# Patient Record
Sex: FEMALE | Race: WHITE | NOT HISPANIC OR LATINO | Employment: UNEMPLOYED | URBAN - METROPOLITAN AREA
[De-identification: names, ages, dates, MRNs, and addresses within clinical notes are randomized per-mention and may not be internally consistent; named-entity substitution may affect disease eponyms.]

---

## 2018-10-22 ENCOUNTER — OFFICE VISIT CONVERTED (OUTPATIENT)
Dept: FAMILY MEDICINE CLINIC | Facility: CLINIC | Age: 23
End: 2018-10-22
Attending: NURSE PRACTITIONER

## 2019-08-14 ENCOUNTER — HOSPITAL ENCOUNTER (OUTPATIENT)
Dept: URGENT CARE | Facility: CLINIC | Age: 24
Discharge: HOME | End: 2019-08-14
Attending: EMERGENCY MEDICINE

## 2019-11-14 ENCOUNTER — HOSPITAL ENCOUNTER (OUTPATIENT)
Dept: URGENT CARE | Facility: CLINIC | Age: 24
Discharge: HOME | End: 2019-11-14
Attending: NURSE PRACTITIONER

## 2019-11-16 LAB — BACTERIA SPEC AEROBE CULT: NORMAL

## 2019-11-26 ENCOUNTER — HOSPITAL ENCOUNTER (OUTPATIENT)
Dept: URGENT CARE | Facility: CLINIC | Age: 24
Discharge: HOME | End: 2019-11-26
Attending: PHYSICIAN ASSISTANT

## 2019-11-28 LAB — BACTERIA SPEC AEROBE CULT: NORMAL

## 2020-03-12 ENCOUNTER — HOSPITAL ENCOUNTER (OUTPATIENT)
Dept: CARDIOLOGY | Facility: HOSPITAL | Age: 25
Discharge: HOME | End: 2020-03-12
Attending: NURSE PRACTITIONER

## 2020-07-23 ENCOUNTER — HOSPITAL ENCOUNTER (OUTPATIENT)
Dept: URGENT CARE | Facility: CLINIC | Age: 25
Discharge: HOME | End: 2020-07-23
Attending: EMERGENCY MEDICINE

## 2020-07-30 ENCOUNTER — HOSPITAL ENCOUNTER (OUTPATIENT)
Dept: URGENT CARE | Facility: CLINIC | Age: 25
Discharge: HOME | End: 2020-07-30
Attending: FAMILY MEDICINE

## 2020-08-10 ENCOUNTER — HOSPITAL ENCOUNTER (OUTPATIENT)
Dept: URGENT CARE | Facility: CLINIC | Age: 25
Discharge: HOME | End: 2020-08-10
Attending: FAMILY MEDICINE

## 2020-09-30 ENCOUNTER — HOSPITAL ENCOUNTER (OUTPATIENT)
Dept: URGENT CARE | Facility: CLINIC | Age: 25
Discharge: HOME | End: 2020-09-30
Attending: FAMILY MEDICINE

## 2020-09-30 LAB
ALBUMIN SERPL-MCNC: 4.2 G/DL (ref 3.5–5)
ALBUMIN/GLOB SERPL: 1.5 {RATIO} (ref 1.4–2.6)
ALP SERPL-CCNC: 81 U/L (ref 42–98)
ALT SERPL-CCNC: 17 U/L (ref 10–40)
ANION GAP SERPL CALC-SCNC: 13 MMOL/L (ref 8–19)
AST SERPL-CCNC: 20 U/L (ref 15–50)
BASOPHILS # BLD AUTO: 0.1 10*3/UL (ref 0–0.2)
BASOPHILS NFR BLD AUTO: 1.2 % (ref 0–3)
BILIRUB SERPL-MCNC: 0.27 MG/DL (ref 0.2–1.3)
BUN SERPL-MCNC: 9 MG/DL (ref 5–25)
BUN/CREAT SERPL: 10 {RATIO} (ref 6–20)
CALCIUM SERPL-MCNC: 9 MG/DL (ref 8.7–10.4)
CHLORIDE SERPL-SCNC: 103 MMOL/L (ref 99–111)
CONV ABS IMM GRAN: 0.07 10*3/UL (ref 0–0.2)
CONV CO2: 26 MMOL/L (ref 22–32)
CONV IMMATURE GRAN: 0.8 % (ref 0–1.8)
CONV TOTAL PROTEIN: 7 G/DL (ref 6.3–8.2)
CREAT UR-MCNC: 0.89 MG/DL (ref 0.5–0.9)
DEPRECATED RDW RBC AUTO: 46 FL (ref 36.4–46.3)
EOSINOPHIL # BLD AUTO: 0.18 10*3/UL (ref 0–0.7)
EOSINOPHIL # BLD AUTO: 2.1 % (ref 0–7)
ERYTHROCYTE [DISTWIDTH] IN BLOOD BY AUTOMATED COUNT: 12.6 % (ref 11.7–14.4)
GFR SERPLBLD BASED ON 1.73 SQ M-ARVRAT: >60 ML/MIN/{1.73_M2}
GLOBULIN UR ELPH-MCNC: 2.8 G/DL (ref 2–3.5)
GLUCOSE SERPL-MCNC: 90 MG/DL (ref 65–99)
HCT VFR BLD AUTO: 44.1 % (ref 37–47)
HGB BLD-MCNC: 14.3 G/DL (ref 12–16)
LYMPHOCYTES # BLD AUTO: 1.9 10*3/UL (ref 1–5)
LYMPHOCYTES NFR BLD AUTO: 22.3 % (ref 20–45)
MCH RBC QN AUTO: 32.3 PG (ref 27–31)
MCHC RBC AUTO-ENTMCNC: 32.4 G/DL (ref 33–37)
MCV RBC AUTO: 99.5 FL (ref 81–99)
MONOCYTES # BLD AUTO: 0.65 10*3/UL (ref 0.2–1.2)
MONOCYTES NFR BLD AUTO: 7.6 % (ref 3–10)
NEUTROPHILS # BLD AUTO: 5.61 10*3/UL (ref 2–8)
NEUTROPHILS NFR BLD AUTO: 66 % (ref 30–85)
NRBC CBCN: 0 % (ref 0–0.7)
OSMOLALITY SERPL CALC.SUM OF ELEC: 282 MOSM/KG (ref 273–304)
PLATELET # BLD AUTO: 270 10*3/UL (ref 130–400)
PMV BLD AUTO: 10.7 FL (ref 9.4–12.3)
POTASSIUM SERPL-SCNC: 4.6 MMOL/L (ref 3.5–5.3)
RBC # BLD AUTO: 4.43 10*6/UL (ref 4.2–5.4)
SODIUM SERPL-SCNC: 137 MMOL/L (ref 135–147)
T4 FREE SERPL-MCNC: 1 NG/DL (ref 0.9–1.8)
TSH SERPL-ACNC: 1.23 M[IU]/L (ref 0.27–4.2)
WBC # BLD AUTO: 8.51 10*3/UL (ref 4.8–10.8)

## 2020-10-02 LAB
BACTERIA SPEC AEROBE CULT: NORMAL
SARS-COV-2 RNA SPEC QL NAA+PROBE: NOT DETECTED

## 2020-10-26 ENCOUNTER — HOSPITAL ENCOUNTER (OUTPATIENT)
Dept: URGENT CARE | Facility: CLINIC | Age: 25
Discharge: HOME | End: 2020-10-26
Attending: PHYSICIAN ASSISTANT

## 2020-10-29 LAB — SARS-COV-2 RNA SPEC QL NAA+PROBE: NOT DETECTED

## 2020-12-10 ENCOUNTER — HOSPITAL ENCOUNTER (OUTPATIENT)
Dept: OTHER | Facility: HOSPITAL | Age: 25
Discharge: HOME | End: 2020-12-10
Attending: EMERGENCY MEDICINE

## 2020-12-12 LAB — SARS-COV-2 RNA SPEC QL NAA+PROBE: NOT DETECTED

## 2021-01-15 ENCOUNTER — HOSPITAL ENCOUNTER (OUTPATIENT)
Dept: URGENT CARE | Facility: CLINIC | Age: 26
Discharge: HOME | End: 2021-01-15
Attending: NURSE PRACTITIONER

## 2021-04-20 ENCOUNTER — OFFICE VISIT CONVERTED (OUTPATIENT)
Dept: UROLOGY | Facility: CLINIC | Age: 26
End: 2021-04-20
Attending: NURSE PRACTITIONER

## 2021-04-20 LAB
BILIRUB UR QL STRIP: NEGATIVE
COLOR UR: YELLOW
CONV BACTERIA IN URINE MICRO: 0
CONV CALCIUM OXALATE CRYSTALS /HPF IN URINE SEDIMENT BY MICROSCOPY: 0
CONV CLARITY OF URINE: CLEAR
CONV PROTEIN IN URINE BY AUTOMATED TEST STRIP: NEGATIVE
CONV UROBILINOGEN IN URINE BY AUTOMATED TEST STRIP: NORMAL
GLUCOSE UR QL: NEGATIVE
HGB UR QL STRIP: NEGATIVE
KETONES UR QL STRIP: NEGATIVE
LEUKOCYTE ESTERASE UR QL STRIP: NEGATIVE
NITRITE UR QL STRIP: NEGATIVE
PH UR STRIP.AUTO: 6.5 [PH]
RBC #/AREA URNS HPF: 0 /[HPF]
RENAL EPI CELLS #/AREA URNS HPF: 0 /[HPF]
SP GR UR: 1.02
SQUAMOUS SPT QL MICRO: 0
WBC #/AREA URNS HPF: 0 /[HPF]
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
## 2021-05-14 VITALS — BODY MASS INDEX: 36.34 KG/M2 | WEIGHT: 218.12 LBS | HEIGHT: 65 IN | RESPIRATION RATE: 12 BRPM

## 2021-05-16 VITALS
WEIGHT: 183.5 LBS | RESPIRATION RATE: 14 BRPM | OXYGEN SATURATION: 98 % | DIASTOLIC BLOOD PRESSURE: 50 MMHG | TEMPERATURE: 98.3 F | SYSTOLIC BLOOD PRESSURE: 122 MMHG | BODY MASS INDEX: 30.57 KG/M2 | HEIGHT: 65 IN | HEART RATE: 88 BPM

## 2021-08-23 ENCOUNTER — HOSPITAL ENCOUNTER (EMERGENCY)
Facility: HOSPITAL | Age: 26
Discharge: HOME | End: 2021-08-23
Attending: EMERGENCY MEDICINE | Admitting: EMERGENCY MEDICINE

## 2021-08-23 VITALS
HEART RATE: 69 BPM | WEIGHT: 216.05 LBS | DIASTOLIC BLOOD PRESSURE: 66 MMHG | SYSTOLIC BLOOD PRESSURE: 92 MMHG | OXYGEN SATURATION: 95 % | HEIGHT: 63 IN | RESPIRATION RATE: 16 BRPM | TEMPERATURE: 98 F | BODY MASS INDEX: 38.28 KG/M2

## 2021-08-23 DIAGNOSIS — N93.8 DYSFUNCTIONAL UTERINE BLEEDING: Primary | ICD-10-CM

## 2021-08-23 LAB
BACTERIA UR QL AUTO: ABNORMAL /HPF
BASOPHILS # BLD AUTO: 0.04 10*3/MM3 (ref 0–0.2)
BASOPHILS NFR BLD AUTO: 0.6 % (ref 0–1.5)
BILIRUB UR QL STRIP: NEGATIVE
CLARITY UR: CLEAR
COLOR UR: YELLOW
DEPRECATED RDW RBC AUTO: 43.4 FL (ref 37–54)
EOSINOPHIL # BLD AUTO: 0.18 10*3/MM3 (ref 0–0.4)
EOSINOPHIL NFR BLD AUTO: 2.7 % (ref 0.3–6.2)
ERYTHROCYTE [DISTWIDTH] IN BLOOD BY AUTOMATED COUNT: 12.7 % (ref 12.3–15.4)
GLUCOSE UR STRIP-MCNC: NEGATIVE MG/DL
HCG INTACT+B SERPL-ACNC: <0.5 MIU/ML
HCT VFR BLD AUTO: 41.2 % (ref 34–46.6)
HGB BLD-MCNC: 13.9 G/DL (ref 12–15.9)
HGB UR QL STRIP.AUTO: ABNORMAL
HOLD SPECIMEN: NORMAL
HOLD SPECIMEN: NORMAL
HYALINE CASTS UR QL AUTO: ABNORMAL /LPF
IMM GRANULOCYTES # BLD AUTO: 0.03 10*3/MM3 (ref 0–0.05)
IMM GRANULOCYTES NFR BLD AUTO: 0.5 % (ref 0–0.5)
KETONES UR QL STRIP: NEGATIVE
LEUKOCYTE ESTERASE UR QL STRIP.AUTO: ABNORMAL
LIPASE SERPL-CCNC: 12 U/L (ref 13–60)
LYMPHOCYTES # BLD AUTO: 1.79 10*3/MM3 (ref 0.7–3.1)
LYMPHOCYTES NFR BLD AUTO: 27 % (ref 19.6–45.3)
MCH RBC QN AUTO: 31.3 PG (ref 26.6–33)
MCHC RBC AUTO-ENTMCNC: 33.7 G/DL (ref 31.5–35.7)
MCV RBC AUTO: 92.8 FL (ref 79–97)
MONOCYTES # BLD AUTO: 0.49 10*3/MM3 (ref 0.1–0.9)
MONOCYTES NFR BLD AUTO: 7.4 % (ref 5–12)
NEUTROPHILS NFR BLD AUTO: 4.11 10*3/MM3 (ref 1.7–7)
NEUTROPHILS NFR BLD AUTO: 61.8 % (ref 42.7–76)
NITRITE UR QL STRIP: NEGATIVE
NRBC BLD AUTO-RTO: 0 /100 WBC (ref 0–0.2)
PH UR STRIP.AUTO: 7 [PH] (ref 5–8)
PLATELET # BLD AUTO: 233 10*3/MM3 (ref 140–450)
PMV BLD AUTO: 10.2 FL (ref 6–12)
PROT UR QL STRIP: NEGATIVE
RBC # BLD AUTO: 4.44 10*6/MM3 (ref 3.77–5.28)
RBC # UR: ABNORMAL /HPF
REF LAB TEST METHOD: ABNORMAL
SP GR UR STRIP: 1.01 (ref 1–1.03)
SQUAMOUS #/AREA URNS HPF: ABNORMAL /HPF
UROBILINOGEN UR QL STRIP: ABNORMAL
WBC # BLD AUTO: 6.64 10*3/MM3 (ref 3.4–10.8)
WBC UR QL AUTO: ABNORMAL /HPF
WHOLE BLOOD HOLD SPECIMEN: NORMAL

## 2021-08-23 PROCEDURE — 84702 CHORIONIC GONADOTROPIN TEST: CPT | Performed by: EMERGENCY MEDICINE

## 2021-08-23 PROCEDURE — 85025 COMPLETE CBC W/AUTO DIFF WBC: CPT | Performed by: EMERGENCY MEDICINE

## 2021-08-23 PROCEDURE — 83690 ASSAY OF LIPASE: CPT | Performed by: EMERGENCY MEDICINE

## 2021-08-23 PROCEDURE — 99283 EMERGENCY DEPT VISIT LOW MDM: CPT

## 2021-08-23 PROCEDURE — 81001 URINALYSIS AUTO W/SCOPE: CPT | Performed by: EMERGENCY MEDICINE

## 2021-08-23 RX ORDER — SODIUM CHLORIDE 0.9 % (FLUSH) 0.9 %
10 SYRINGE (ML) INJECTION AS NEEDED
Status: DISCONTINUED | OUTPATIENT
Start: 2021-08-23 | End: 2021-08-23 | Stop reason: HOSPADM

## 2021-08-23 NOTE — ED PROVIDER NOTES
"Subjective   Pt states she took + pregnancy test at home last night and this AM woke up to cramping and bleeding.      History provided by:  Patient   used: No    Vaginal Bleeding  Quality:  Bright red and clots  Severity:  Moderate  Onset quality:  Gradual  Duration:  12 hours  Timing:  Intermittent  Progression:  Worsening  Chronicity:  New  Possible pregnancy: yes    Relieved by:  Nothing  Worsened by:  Nothing  Ineffective treatments:  None tried  Associated symptoms: no fatigue and no fever        Review of Systems   Constitutional: Negative for appetite change, chills, fatigue and fever.   HENT: Negative.    Eyes: Negative.  Negative for photophobia.   Respiratory: Negative.    Gastrointestinal: Negative.    Endocrine: Negative.    Genitourinary: Positive for vaginal bleeding.   Musculoskeletal: Negative.    Skin: Negative.    Allergic/Immunologic: Negative.  Negative for immunocompromised state.   Neurological: Negative.    Hematological: Negative.    Psychiatric/Behavioral: Negative.    All other systems reviewed and are negative.      Past Medical History:   Diagnosis Date   • Allergic rhinitis    • Anxiety    • Broken bones    • Chronic allergic rhinitis    • Condition not found     Ulcer, Unspecified   • Depression    • Forgetfulness    • H/O psychiatric care    • HBP (high blood pressure)    • Hx of migraine headaches    • Mood disorder (CMS/[facility])    • Rectal bleeding    • Seizure (CMS/[facility])    • Sinus trouble    • Urinary incontinence 04/20/2021       Allergies   Allergen Reactions   • Dilaudid [Hydromorphone] Diarrhea       Past Surgical History:   Procedure Laterality Date   • ARM TENDON REPAIR      Arm Surgery   • MANDIBLE SURGERY      Jaw Wired   • OTHER SURGICAL HISTORY      \"Metal Implant\"   • [place] TOOTH EXTRACTION         Family History   Problem Relation Age of Onset   • Diabetes Paternal Grandmother         Unspecified Type   • Diabetes Paternal Grandfather         " Unspecified Type   • Diabetes Paternal Uncle         Unspecified Type   • Colon cancer Other         In her 60s   • Breast cancer Other         In her 60s       Social History     Socioeconomic History   • Marital status: Single     Spouse name: Not on file   • Number of children: Not on file   • Years of education: Not on file   • Highest education level: Not on file   Tobacco Use   • Smoking status: Current Every Day Smoker           Objective   Physical Exam  Vitals and nursing note reviewed. Exam conducted with a chaperone present.   Constitutional:       General: She is not in acute distress.     Appearance: Normal appearance. She is not toxic-appearing.   HENT:      Head: Normocephalic and atraumatic.   Eyes:      Extraocular Movements: Extraocular movements intact.      Pupils: Pupils are equal, round, and reactive to light.   Cardiovascular:      Rate and Rhythm: Normal rate and regular rhythm.      Pulses: Normal pulses.      Heart sounds: Normal heart sounds.   Pulmonary:      Effort: Pulmonary effort is normal. No respiratory distress.      Breath sounds: Normal breath sounds.   Abdominal:      General: Abdomen is flat.      Palpations: Abdomen is soft.      Tenderness: There is abdominal tenderness (suprapubic).   Genitourinary:     Uterus: Normal.       Adnexa: Right adnexa normal and left adnexa normal.      Comments: Bright red blood  Musculoskeletal:         General: Normal range of motion.      Cervical back: Normal range of motion and neck supple.   Skin:     General: Skin is warm and dry.   Neurological:      Mental Status: She is alert and oriented to person, place, and time. Mental status is at baseline.           MDM  Number of Diagnoses or Management Options  Dysfunctional uterine bleeding  Diagnosis management comments: Pt is a 26 y.o. female that presents today with Patient presents with:  Pregnancy Problem: PT STATES THAT SHE IS 8 DAYS LATE AND SHE KEPT TAKING PREGNANCY TESTS AND THE ONE  FROM YESTERDAY WAS POSITIVE, PT STARTED BLEEDING AND HAVING EXCESSIVE CRAMPING TODAY.        Work up today:  Lab Results (last 24 hours)     Procedure Component Value Units Date/Time    CBC & Differential ([id number])  (Normal) Collected: 08/23/21 1214    Specimen: Blood Updated: 08/23/21 1250    Narrative:      The following orders were created for panel order CBC & Differential.  Procedure                               Abnormality         Status                       ---------                               -----------         ------                       CBC Auto Differential([id number])        Normal              Final result                   Please view results for these tests on the individual orders.    Lipase ([id number])  (Abnormal) Collected: 08/23/21 1214    Specimen: Blood Updated: 08/23/21 1250     Lipase 12 U/L     hCG, Quantitative, Pregnancy ([id number]) Collected: 08/23/21 1214    Specimen: Blood Updated: 08/23/21 1253     HCG Quantitative <0.50 mIU/mL     Narrative:      HCG Ranges by Gestational Age    Females - non-pregnant premenopausal   </= 1mIU/mL HCG  Females - postmenopausal               </= 7mIU/mL HCG    3 Weeks       5.4   -      72 mIU/mL  4 Weeks      10.2   -     708 mIU/mL  5 Weeks       217   -   8,245 mIU/mL  6 Weeks       152   -  32,177 mIU/mL  7 Weeks     4,059   - 153,767 mIU/mL  8 Weeks    31,366   - 149,094 mIU/mL  9 Weeks    59,109   - 135,901 mIU/mL  10 Weeks   44,186   - 170,409 mIU/mL  12 Weeks   27,107   - 201,615 mIU/mL  14 Weeks   24,302   -  93,646 mIU/mL  15 Weeks   12,540   -  69,747 mIU/mL  16 Weeks    8,904   -  55,332 mIU/mL  17 Weeks    8,240   -  51,793 mIU/mL  18 Weeks    9,649   -  55,271 mIU/mL    Results may be falsely decreased if patient taking Biotin.      Urinalysis With Microscopic If Indicated (No Culture) - Urine, Clean   Catch ([id number]) Collected: 08/23/21 1214    Specimen: Urine, Clean Catch Updated: 08/23/21 1240    CBC Auto Differential  ([id number])  (Normal) Collected: 08/23/21 1214    Specimen: Blood Updated: 08/23/21 1250     WBC 6.64 10*3/mm3      RBC 4.44 10*6/mm3      Hemoglobin 13.9 g/dL      Hematocrit 41.2 %      MCV 92.8 fL      MCH 31.3 pg      MCHC 33.7 g/dL      RDW 12.7 %      RDW-SD 43.4 fl      MPV 10.2 fL      Platelets 233 10*3/mm3      Neutrophil % 61.8 %      Lymphocyte % 27.0 %      Monocyte % 7.4 %      Eosinophil % 2.7 %      Basophil % 0.6 %      Immature Grans % 0.5 %      Neutrophils, Absolute 4.11 10*3/mm3      Lymphocytes, Absolute 1.79 10*3/mm3      Monocytes, Absolute 0.49 10*3/mm3      Eosinophils, Absolute 0.18 10*3/mm3      Basophils, Absolute 0.04 10*3/mm3      Immature Grans, Absolute 0.03 10*3/mm3      nRBC 0.0 /100 WBC     Urinalysis, Microscopic Only - Urine, Clean Catch ([id number]) Collected:   08/23/21 1214    Specimen: Urine, Clean Catch Updated: 08/23/21 1251      No results found.   @No orders to display     Pt is otherwise non toxic and non ill appearing and stable for d/c home.  Pt is in agreement with this.  All questions answered at bedside.          Amount and/or Complexity of Data Reviewed  Clinical lab tests: reviewed    Risk of Complications, Morbidity, and/or Mortality  Presenting problems: low  Diagnostic procedures: low  Management options: low    Patient Progress  Patient progress: stable      Final diagnoses:   Dysfunctional uterine bleeding       ED Disposition  ED Disposition     ED Disposition Condition Comment    Discharge Stable           Amber Pearson, APRN  [address] [facility] [address]  [place] [address]  [phone]    Schedule an appointment as soon as possible for a visit in 1 week  for further eval         Medication List      No changes were made to your prescriptions during this visit.          Idris Reed PA  08/23/21 1300

## 2021-09-10 PROCEDURE — U0004 COV-19 TEST NON-CDC HGH THRU: HCPCS | Performed by: NURSE PRACTITIONER

## 2021-09-12 ENCOUNTER — TELEPHONE (OUTPATIENT)
Dept: URGENT CARE | Facility: CLINIC | Age: 26
End: 2021-09-12

## 2021-09-12 NOTE — TELEPHONE ENCOUNTER
----- Message from GENET Fisher sent at 9/11/2021  6:06 PM EDT -----  Please call the patient regarding her normal result.    Please let patient know that her COVID-19 test is negative.

## 2021-10-14 ENCOUNTER — OFFICE VISIT (OUTPATIENT)
Dept: OBSTETRICS AND GYNECOLOGY | Facility: CLINIC | Age: 26
End: 2021-10-14

## 2021-10-14 VITALS
DIASTOLIC BLOOD PRESSURE: 79 MMHG | WEIGHT: 216 LBS | SYSTOLIC BLOOD PRESSURE: 115 MMHG | HEART RATE: 79 BPM | HEIGHT: 65 IN | BODY MASS INDEX: 35.99 KG/M2

## 2021-10-14 DIAGNOSIS — B00.9 HSV (HERPES SIMPLEX VIRUS) INFECTION: ICD-10-CM

## 2021-10-14 DIAGNOSIS — Z01.419 ROUTINE GYNECOLOGICAL EXAMINATION: Primary | ICD-10-CM

## 2021-10-14 PROBLEM — F41.9 ANXIETY: Status: ACTIVE | Noted: 2019-03-29

## 2021-10-14 PROBLEM — F32.A DEPRESSION: Status: ACTIVE | Noted: 2021-10-14

## 2021-10-14 PROBLEM — T74.21XA ADULT VICTIM OF SEXUAL ABUSE: Status: ACTIVE | Noted: 2019-03-29

## 2021-10-14 PROBLEM — R32 URINARY INCONTINENCE: Status: ACTIVE | Noted: 2021-04-20

## 2021-10-14 PROBLEM — I10 HIGH BLOOD PRESSURE: Status: ACTIVE | Noted: 2021-10-14

## 2021-10-14 PROBLEM — R56.9 SEIZURE: Status: ACTIVE | Noted: 2021-10-14

## 2021-10-14 PROBLEM — F12.90 MARIJUANA USER: Status: ACTIVE | Noted: 2021-09-13

## 2021-10-14 PROBLEM — J34.9 SINUS TROUBLE: Status: ACTIVE | Noted: 2021-10-14

## 2021-10-14 PROBLEM — J30.9 ALLERGIC RHINITIS: Status: ACTIVE | Noted: 2021-10-14

## 2021-10-14 PROBLEM — T14.8XXA BROKEN BONES: Status: ACTIVE | Noted: 2021-10-14

## 2021-10-14 PROBLEM — E55.9 VITAMIN D DEFICIENCY: Status: ACTIVE | Noted: 2020-02-11

## 2021-10-14 PROBLEM — R68.89 FORGETFULNESS: Status: ACTIVE | Noted: 2021-10-14

## 2021-10-14 PROBLEM — K62.5 RECTAL BLEEDING: Status: ACTIVE | Noted: 2021-10-14

## 2021-10-14 PROBLEM — IMO0002 ULCERATIVE LESION: Status: ACTIVE | Noted: 2021-10-14

## 2021-10-14 PROBLEM — F17.200 NICOTINE DEPENDENCE: Status: ACTIVE | Noted: 2019-03-29

## 2021-10-14 PROBLEM — E28.2 POLYCYSTIC OVARY SYNDROME: Status: ACTIVE | Noted: 2021-07-20

## 2021-10-14 LAB
GLUCOSE UR STRIP-MCNC: NEGATIVE MG/DL
PROT UR STRIP-MCNC: NEGATIVE MG/DL

## 2021-10-14 PROCEDURE — 3008F BODY MASS INDEX DOCD: CPT | Performed by: OBSTETRICS & GYNECOLOGY

## 2021-10-14 PROCEDURE — 2014F MENTAL STATUS ASSESS: CPT | Performed by: OBSTETRICS & GYNECOLOGY

## 2021-10-14 PROCEDURE — 99395 PREV VISIT EST AGE 18-39: CPT | Performed by: OBSTETRICS & GYNECOLOGY

## 2021-10-14 PROCEDURE — 88175 CYTOPATH C/V AUTO FLUID REDO: CPT | Performed by: OBSTETRICS & GYNECOLOGY

## 2021-10-14 RX ORDER — PAROXETINE 10 MG/1
10 TABLET, FILM COATED ORAL NIGHTLY
COMMUNITY
End: 2021-12-20

## 2021-10-14 RX ORDER — BUSPIRONE HYDROCHLORIDE 15 MG/1
15 TABLET ORAL DAILY
COMMUNITY
End: 2022-07-21

## 2021-10-14 RX ORDER — VALACYCLOVIR HYDROCHLORIDE 500 MG/1
500 TABLET, FILM COATED ORAL DAILY
Qty: 90 TABLET | Refills: 3 | Status: SHIPPED | OUTPATIENT
Start: 2021-10-14

## 2021-10-14 NOTE — PROGRESS NOTES
"Well Woman Visit    CC: Annual well woman exam       HPI:   26 y.o. Contraception or HRT: None  Menses:   q mon, lasts 3-5 days, changes products q 3hrs on heaviest days.   Pain:  Mild, OTC meds control discomfort    Pt has no complaints today. no hsv outbreaks with daily valtrex.      History: PMHx, Meds, Allergies, PSHx, Social Hx, and POBHx all reviewed and updated.      PHYSICAL EXAM:  /79 (BP Location: Right arm, Patient Position: Sitting, Cuff Size: Adult)   Pulse 79   Ht 165.1 cm (65\")   Wt 98 kg (216 lb)   LMP 2021   Breastfeeding No   BMI 35.94 kg/m²   General- NAD, alert and oriented, appropriate  Psych- Normal mood, good memory  Neck- No masses, no thyroid enlargement  CV- Regular rhythm, no murnurs  Resp- CTA to bases, no wheezes  Abdomen- Soft, non distended, non tender, no masses    Breast left-  Bilaterally symmetrical, no masses, non tender, no nipple discharge  Breast right- Bilaterally symmetrical, no masses, non tender, no nipple discharge    External genitalia- Normal female, no lesions  Urethra/meatus- Normal, no masses, non tender, no prolapse  Bladder- Normal, no masses, non tender, no prolapse  Vagina- Normal, no atrophy, no lesions, no discharge, no prolapse  Cvx- Normal, no lesions, no discharge, No cervical motion tenderness  Uterus- Normal size, shape & consistency.  Non tender, mobile, & no prolapse  Adnexa- No mass, non tender  Anus/Rectum/Perineum- Not performed    Lymphatic- No palpable neck, axillary, or groin nodes  Ext- No edema, no cyanosis    Skin- No lesions, no rashes, no acanthosis nigricans        ASSESSMENT and PLAN:  WWE    Diagnoses and all orders for this visit:    1. Routine gynecological examination (Primary)  -     POC Urinalysis Dipstick  -     IGP,rfx Aptima HPV All Pth    2. HSV (herpes simplex virus) infection  -     valACYclovir (VALTREX) 500 MG tablet; Take 1 tablet by mouth Daily.  Dispense: 90 tablet; Refill: 3    declines " bc    Counseling:     Track menses, RTO IF <q21d, >7d long, or heavy    Domestic violence/abuse screen: negative    Depression screen: no SI    Preventative:   BREAST HEALTH- Monthly self breast exam importance and how to reviewed. MMG and/or MRI (prn) reviewed per society guidelines and her individual history. Screen: Not medically needed.  CERVICAL CANCER Screening- Reviewed current ASCCP guidelines for screening w and wo cotest HPV, age specific.  Screen: Updated today, lgsil 2020.  COLON CANCER Screening- Reviewed current medical society guidelines and options.  Screen:  Not medically needed.  SEXUAL HEALTH: STD screening recommended.  She declines.  She understands risks of STDs NLT infection (herself and current/future partners), infertility, chronic pain, potential future surgery/complications.  VACCINATIONS Recommended: Flu annually, Gardisil/HPV vaccine (up to 44yo).  Importance discussed, risk being unvaccinated reviewed.  Questions answered  SMOKING STATUS- pt does use nicotine and/or tobacco.  I reviewed importance of avoiding.  Options to quit provided per [employer] protocols.  Recommend FU w PCP regarding quitting options if unable to quit wo assistance.  Myriad: Does not qualify.  Gardasil status: declines.      She understands the importance of having any ordered tests to be performed in a timely fashion.  She is encouraged to review her results online and/or contact or office if she has questions.     Follow Up:  Return if symptoms worsen or fail to improve.      Kia Jasso, APRN  10/14/2021

## 2021-10-19 LAB
CONV .: NORMAL
CYTOLOGIST CVX/VAG CYTO: NORMAL
CYTOLOGY CVX/VAG DOC CYTO: NORMAL
CYTOLOGY CVX/VAG DOC THIN PREP: NORMAL
DX ICD CODE: NORMAL
HIV 1 & 2 AB SER-IMP: NORMAL
OTHER STN SPEC: NORMAL
STAT OF ADQ CVX/VAG CYTO-IMP: NORMAL

## 2022-01-15 PROCEDURE — 87086 URINE CULTURE/COLONY COUNT: CPT | Performed by: NURSE PRACTITIONER

## 2022-01-16 ENCOUNTER — TELEPHONE (OUTPATIENT)
Dept: URGENT CARE | Facility: CLINIC | Age: 27
End: 2022-01-16

## 2022-01-16 NOTE — TELEPHONE ENCOUNTER
----- Message from GENET Chávez sent at 1/16/2022  1:33 PM EST -----  Please call the patient regarding her negative result.

## 2022-01-20 PROCEDURE — U0004 COV-19 TEST NON-CDC HGH THRU: HCPCS | Performed by: FAMILY MEDICINE

## 2022-01-21 ENCOUNTER — TELEPHONE (OUTPATIENT)
Dept: URGENT CARE | Facility: CLINIC | Age: 27
End: 2022-01-21

## 2022-03-17 ENCOUNTER — TRANSCRIBE ORDERS (OUTPATIENT)
Dept: GENERAL RADIOLOGY | Facility: HOSPITAL | Age: 27
End: 2022-03-17

## 2022-03-17 ENCOUNTER — HOSPITAL ENCOUNTER (OUTPATIENT)
Dept: GENERAL RADIOLOGY | Facility: HOSPITAL | Age: 27
Discharge: HOME | End: 2022-03-17
Admitting: NURSE PRACTITIONER

## 2022-03-17 DIAGNOSIS — M54.50 LOW BACK PAIN, UNSPECIFIED BACK PAIN LATERALITY, UNSPECIFIED CHRONICITY, UNSPECIFIED WHETHER SCIATICA PRESENT: ICD-10-CM

## 2022-03-17 DIAGNOSIS — M54.50 LOW BACK PAIN, UNSPECIFIED BACK PAIN LATERALITY, UNSPECIFIED CHRONICITY, UNSPECIFIED WHETHER SCIATICA PRESENT: Primary | ICD-10-CM

## 2022-03-17 PROCEDURE — 72100 X-RAY EXAM L-S SPINE 2/3 VWS: CPT

## 2023-08-11 ENCOUNTER — APPOINTMENT (OUTPATIENT)
Dept: CT IMAGING | Facility: HOSPITAL | Age: 28
End: 2023-08-11
Payer: COMMERCIAL

## 2023-08-11 ENCOUNTER — APPOINTMENT (OUTPATIENT)
Dept: ULTRASOUND IMAGING | Facility: HOSPITAL | Age: 28
End: 2023-08-11
Payer: COMMERCIAL

## 2023-08-11 ENCOUNTER — HOSPITAL ENCOUNTER (EMERGENCY)
Facility: HOSPITAL | Age: 28
Discharge: HOME | End: 2023-08-11
Attending: EMERGENCY MEDICINE
Payer: COMMERCIAL

## 2023-08-11 VITALS
SYSTOLIC BLOOD PRESSURE: 137 MMHG | OXYGEN SATURATION: 97 % | BODY MASS INDEX: 40.7 KG/M2 | RESPIRATION RATE: 14 BRPM | WEIGHT: 244.27 LBS | TEMPERATURE: 98.3 F | HEART RATE: 65 BPM | HEIGHT: 65 IN | DIASTOLIC BLOOD PRESSURE: 69 MMHG

## 2023-08-11 DIAGNOSIS — N83.209 CYST OF OVARY, UNSPECIFIED LATERALITY: Primary | ICD-10-CM

## 2023-08-11 LAB
ALBUMIN SERPL-MCNC: 4 G/DL (ref 3.5–5.2)
ALBUMIN/GLOB SERPL: 1.4 G/DL
ALP SERPL-CCNC: 86 U/L (ref 39–117)
ALT SERPL W P-5'-P-CCNC: 13 U/L (ref 1–33)
ANION GAP SERPL CALCULATED.3IONS-SCNC: 11.2 MMOL/L (ref 5–15)
AST SERPL-CCNC: 13 U/L (ref 1–32)
BACTERIA UR QL AUTO: ABNORMAL /HPF
BASOPHILS # BLD AUTO: 0.05 10*3/MM3 (ref 0–0.2)
BASOPHILS NFR BLD AUTO: 0.5 % (ref 0–1.5)
BILIRUB SERPL-MCNC: 0.3 MG/DL (ref 0–1.2)
BILIRUB UR QL STRIP: NEGATIVE
BUN SERPL-MCNC: 7 MG/DL (ref 6–20)
BUN/CREAT SERPL: 9.2 (ref 7–25)
CALCIUM SPEC-SCNC: 8.8 MG/DL (ref 8.6–10.5)
CHLORIDE SERPL-SCNC: 104 MMOL/L (ref 98–107)
CLARITY UR: ABNORMAL
CO2 SERPL-SCNC: 20.8 MMOL/L (ref 22–29)
COLOR UR: YELLOW
CREAT SERPL-MCNC: 0.76 MG/DL (ref 0.57–1)
DEPRECATED RDW RBC AUTO: 43.2 FL (ref 37–54)
EGFRCR SERPLBLD CKD-EPI 2021: 110.3 ML/MIN/1.73
EOSINOPHIL # BLD AUTO: 0.22 10*3/MM3 (ref 0–0.4)
EOSINOPHIL NFR BLD AUTO: 2.4 % (ref 0.3–6.2)
ERYTHROCYTE [DISTWIDTH] IN BLOOD BY AUTOMATED COUNT: 13.2 % (ref 12.3–15.4)
GLOBULIN UR ELPH-MCNC: 2.8 GM/DL
GLUCOSE SERPL-MCNC: 138 MG/DL (ref 65–99)
GLUCOSE UR STRIP-MCNC: NEGATIVE MG/DL
HCG INTACT+B SERPL-ACNC: <0.5 MIU/ML
HCT VFR BLD AUTO: 39.6 % (ref 34–46.6)
HGB BLD-MCNC: 13.5 G/DL (ref 12–15.9)
HGB UR QL STRIP.AUTO: NEGATIVE
HOLD SPECIMEN: NORMAL
HOLD SPECIMEN: NORMAL
HYALINE CASTS UR QL AUTO: ABNORMAL /LPF
IMM GRANULOCYTES # BLD AUTO: 0.08 10*3/MM3 (ref 0–0.05)
IMM GRANULOCYTES NFR BLD AUTO: 0.9 % (ref 0–0.5)
KETONES UR QL STRIP: NEGATIVE
LEUKOCYTE ESTERASE UR QL STRIP.AUTO: ABNORMAL
LIPASE SERPL-CCNC: 12 U/L (ref 13–60)
LYMPHOCYTES # BLD AUTO: 2.56 10*3/MM3 (ref 0.7–3.1)
LYMPHOCYTES NFR BLD AUTO: 27.8 % (ref 19.6–45.3)
MCH RBC QN AUTO: 30.8 PG (ref 26.6–33)
MCHC RBC AUTO-ENTMCNC: 34.1 G/DL (ref 31.5–35.7)
MCV RBC AUTO: 90.4 FL (ref 79–97)
MONOCYTES # BLD AUTO: 0.44 10*3/MM3 (ref 0.1–0.9)
MONOCYTES NFR BLD AUTO: 4.8 % (ref 5–12)
NEUTROPHILS NFR BLD AUTO: 5.85 10*3/MM3 (ref 1.7–7)
NEUTROPHILS NFR BLD AUTO: 63.6 % (ref 42.7–76)
NITRITE UR QL STRIP: NEGATIVE
NRBC BLD AUTO-RTO: 0 /100 WBC (ref 0–0.2)
PH UR STRIP.AUTO: 6.5 [PH] (ref 5–8)
PLATELET # BLD AUTO: 244 10*3/MM3 (ref 140–450)
PMV BLD AUTO: 9.5 FL (ref 6–12)
POTASSIUM SERPL-SCNC: 3.6 MMOL/L (ref 3.5–5.2)
PROT SERPL-MCNC: 6.8 G/DL (ref 6–8.5)
PROT UR QL STRIP: NEGATIVE
RBC # BLD AUTO: 4.38 10*6/MM3 (ref 3.77–5.28)
RBC # UR STRIP: ABNORMAL /HPF
REF LAB TEST METHOD: ABNORMAL
SODIUM SERPL-SCNC: 136 MMOL/L (ref 136–145)
SP GR UR STRIP: 1.02 (ref 1–1.03)
SQUAMOUS #/AREA URNS HPF: ABNORMAL /HPF
UROBILINOGEN UR QL STRIP: ABNORMAL
WBC # UR STRIP: ABNORMAL /HPF
WBC NRBC COR # BLD: 9.2 10*3/MM3 (ref 3.4–10.8)
WHOLE BLOOD HOLD COAG: NORMAL
WHOLE BLOOD HOLD SPECIMEN: NORMAL

## 2023-08-11 PROCEDURE — 25010000002 CEFTRIAXONE PER 250 MG: Performed by: EMERGENCY MEDICINE

## 2023-08-11 PROCEDURE — 96374 THER/PROPH/DIAG INJ IV PUSH: CPT

## 2023-08-11 PROCEDURE — 83690 ASSAY OF LIPASE: CPT | Performed by: EMERGENCY MEDICINE

## 2023-08-11 PROCEDURE — 81001 URINALYSIS AUTO W/SCOPE: CPT | Performed by: EMERGENCY MEDICINE

## 2023-08-11 PROCEDURE — 96375 TX/PRO/DX INJ NEW DRUG ADDON: CPT

## 2023-08-11 PROCEDURE — 96365 THER/PROPH/DIAG IV INF INIT: CPT

## 2023-08-11 PROCEDURE — 85025 COMPLETE CBC W/AUTO DIFF WBC: CPT | Performed by: EMERGENCY MEDICINE

## 2023-08-11 PROCEDURE — 25010000002 KETOROLAC TROMETHAMINE PER 15 MG: Performed by: EMERGENCY MEDICINE

## 2023-08-11 PROCEDURE — 99284 EMERGENCY DEPT VISIT MOD MDM: CPT

## 2023-08-11 PROCEDURE — 74176 CT ABD & PELVIS W/O CONTRAST: CPT

## 2023-08-11 PROCEDURE — 84702 CHORIONIC GONADOTROPIN TEST: CPT | Performed by: EMERGENCY MEDICINE

## 2023-08-11 PROCEDURE — 76830 TRANSVAGINAL US NON-OB: CPT

## 2023-08-11 PROCEDURE — 80053 COMPREHEN METABOLIC PANEL: CPT | Performed by: EMERGENCY MEDICINE

## 2023-08-11 RX ORDER — SODIUM CHLORIDE 0.9 % (FLUSH) 0.9 %
10 SYRINGE (ML) INJECTION AS NEEDED
Status: DISCONTINUED | OUTPATIENT
Start: 2023-08-11 | End: 2023-08-11 | Stop reason: HOSPADM

## 2023-08-11 RX ORDER — KETOROLAC TROMETHAMINE 10 MG/1
10 TABLET, FILM COATED ORAL EVERY 6 HOURS PRN
Qty: 15 TABLET | Refills: 0 | Status: SHIPPED | OUTPATIENT
Start: 2023-08-11

## 2023-08-11 RX ORDER — CEFTRIAXONE SODIUM 1 G/50ML
1000 INJECTION, SOLUTION INTRAVENOUS ONCE
Status: COMPLETED | OUTPATIENT
Start: 2023-08-11 | End: 2023-08-11

## 2023-08-11 RX ORDER — OXYCODONE HYDROCHLORIDE AND ACETAMINOPHEN 5; 325 MG/1; MG/1
1 TABLET ORAL EVERY 6 HOURS PRN
Qty: 12 TABLET | Refills: 0 | Status: SHIPPED | OUTPATIENT
Start: 2023-08-11

## 2023-08-11 RX ORDER — CEPHALEXIN 500 MG/1
500 CAPSULE ORAL 4 TIMES DAILY
Qty: 40 CAPSULE | Refills: 0 | Status: SHIPPED | OUTPATIENT
Start: 2023-08-11

## 2023-08-11 RX ORDER — KETOROLAC TROMETHAMINE 30 MG/ML
30 INJECTION, SOLUTION INTRAMUSCULAR; INTRAVENOUS ONCE
Status: COMPLETED | OUTPATIENT
Start: 2023-08-11 | End: 2023-08-11

## 2023-08-11 RX ADMIN — CEFTRIAXONE SODIUM 1000 MG: 1 INJECTION, SOLUTION INTRAVENOUS at 13:49

## 2023-08-11 RX ADMIN — KETOROLAC TROMETHAMINE 30 MG: 30 INJECTION, SOLUTION INTRAMUSCULAR; INTRAVENOUS at 13:49

## 2023-08-11 NOTE — ED PROVIDER NOTES
"Time: 3:54 PM EDT  Date of encounter:  8/11/2023  Independent Historian/Clinical History and Information was obtained by:   Patient    History is limited by: N/A    Chief Complaint: Abdominal pain      History of Present Illness:  Patient is a 28 y.o. year old female who presents to the emergency department for evaluation of right-sided abdominal pain that started earlier today.  Patient denies fever and chills.  Patient has no chest pain or shortness of breath.  Patient denies dysuria urinary frequency.  Patient denies cough and hemoptysis.  Patient denies nausea, vomiting, and diarrhea.    HPI    Patient Care Team  Primary Care Provider: Amber Pearson APRN    Past Medical History:     No Known Allergies  Past Medical History:   Diagnosis Date    Allergic rhinitis     Anxiety     Broken bones     Chronic allergic rhinitis     Condition not found     Ulcer, Unspecified    Depression     Forgetfulness     H/O psychiatric care     HBP (high blood pressure)     History of abnormal cervical Pap smear     Hx of migraine headaches     Mood disorder     Rectal bleeding     Seizure     Sinus trouble     Urinary incontinence 04/20/2021     Past Surgical History:   Procedure Laterality Date    ARM TENDON REPAIR      Arm Surgery    KNEE SURGERY      MANDIBLE SURGERY      Jaw Wired    OTHER SURGICAL HISTORY      \"Metal Implant\"    WISDOM TOOTH EXTRACTION       Family History   Problem Relation Age of Onset    Diabetes Paternal Grandmother         Unspecified Type    Diabetes Paternal Grandfather         Unspecified Type    Diabetes Paternal Uncle         Unspecified Type    Colon cancer Other         In her 60s    Breast cancer Other         In her 60s    Colon cancer Maternal Aunt     Lung cancer Maternal Grandmother     Deep vein thrombosis Maternal Grandmother     Heart disease Maternal Grandmother        Home Medications:  Prior to Admission medications    Medication Sig Start Date End Date Taking? Authorizing Provider "   levothyroxine (SYNTHROID, LEVOTHROID) 50 MCG tablet Take 1 tablet by mouth Daily. 6/27/23   Provider, MD Candy   valACYclovir (VALTREX) 500 MG tablet Take 1 tablet by mouth Daily. 10/14/21   Kia Jasso APRN   Brexpiprazole (Rexulti) 2 MG tablet Rexulti 2 mg tablet   Take 1 tablet every day by oral route for 30 days.   PT HAD 90 DAYS SUPPLY OF 1 MG  8/11/23  Emergency, Nurse Girma, RN   busPIRone (BUSPAR) 15 MG tablet  9/29/22 8/11/23  Emergency, Nurse Epic, RN   lidocaine (XYLOCAINE) 5 % ointment Apply 1 application topically to the appropriate area as directed Every 2 (Two) Hours As Needed for Moderate Pain (LEFT wrist). 12/11/22 8/11/23  Davide Peterson DO   losartan (COZAAR) 25 MG tablet losartan 25 mg tablet   TAKE 1 TABLET BY MOUTH EVERY DAY IN THE MORNING  7/21/22  Emergency, Nurse Epic, RN   naproxen (EC NAPROSYN) 500 MG EC tablet Take 1 tablet by mouth 2 (Two) Times a Day As Needed (left wrist pain). 12/11/22 8/11/23  Davide Peterson DO   vilazodone (VIIBRYD) 40 MG tablet tablet Viibryd 40 mg tablet   TAKE 1 TABLET BY MOUTH EVERY DAY  8/11/23  Emergency, Nurse Girma, RN        Social History:   Social History     Tobacco Use    Smoking status: Every Day     Packs/day: 1.00     Years: 10.00     Pack years: 10.00     Types: Cigarettes    Smokeless tobacco: Never   Vaping Use    Vaping Use: Never used   Substance Use Topics    Alcohol use: Not Currently    Drug use: Yes     Types: Marijuana         Review of Systems:  Review of Systems   Constitutional:  Negative for chills and fever.   HENT:  Negative for congestion, rhinorrhea and sore throat.    Eyes:  Negative for pain and visual disturbance.   Respiratory:  Negative for apnea, cough, chest tightness and shortness of breath.    Cardiovascular:  Negative for chest pain and palpitations.   Gastrointestinal:  Positive for abdominal pain. Negative for diarrhea, nausea and vomiting.   Genitourinary:  Negative for difficulty urinating and dysuria.  "  Musculoskeletal:  Negative for joint swelling and myalgias.   Skin:  Negative for color change.   Neurological:  Negative for seizures and headaches.   Psychiatric/Behavioral: Negative.     All other systems reviewed and are negative.     Physical Exam:  /69 (BP Location: Left arm, Patient Position: Sitting)   Pulse 65   Temp 98.3 øF (36.8 øC) (Oral)   Resp 14   Ht 165.1 cm (65\")   Wt 111 kg (244 lb 4.3 oz)   LMP 07/19/2023   SpO2 97%   BMI 40.65 kg/mý     Physical Exam  Vitals and nursing note reviewed.   Constitutional:       General: She is not in acute distress.     Appearance: Normal appearance. She is not toxic-appearing.   HENT:      Head: Normocephalic and atraumatic.      Jaw: There is normal jaw occlusion.   Eyes:      General: Lids are normal.      Extraocular Movements: Extraocular movements intact.      Conjunctiva/sclera: Conjunctivae normal.      Pupils: Pupils are equal, round, and reactive to light.   Cardiovascular:      Rate and Rhythm: Normal rate and regular rhythm.      Pulses: Normal pulses.      Heart sounds: Normal heart sounds.   Pulmonary:      Effort: Pulmonary effort is normal. No respiratory distress.      Breath sounds: Normal breath sounds. No wheezing or rhonchi.   Abdominal:      General: Abdomen is flat.      Palpations: Abdomen is soft.      Tenderness: There is no abdominal tenderness in the right lower quadrant. There is no guarding or rebound.   Musculoskeletal:         General: Normal range of motion.      Cervical back: Normal range of motion and neck supple.      Right lower leg: No edema.      Left lower leg: No edema.   Skin:     General: Skin is warm and dry.   Neurological:      Mental Status: She is alert and oriented to person, place, and time. Mental status is at baseline.   Psychiatric:         Mood and Affect: Mood normal.              Procedures:  Procedures      Medical Decision Making:      Comorbidities that affect care:    None    External Notes " reviewed:    Previous Clinic Note: Patient was seen in clinic for evaluation of left hand injury.      The following orders were placed and all results were independently analyzed by me:  Orders Placed This Encounter   Procedures    CT Abdomen Pelvis Without Contrast    US Non-ob Transvaginal    [place] Draw    Comprehensive Metabolic Panel    Lipase    Urinalysis With Microscopic If Indicated (No Culture) - Urine, Clean Catch    hCG, Quantitative, Pregnancy    CBC Auto Differential    Urinalysis, Microscopic Only - Urine, Clean Catch    NPO Diet NPO Type: Strict NPO    Undress & Gown    Insert Peripheral IV    CBC & Differential    Green Top (Gel)    Lavender Top    Gold Top - SST    Light Blue Top       Medications Given in the Emergency Department:  Medications   sodium chloride 0.9 % flush 10 mL (has no administration in time range)   cefTRIAXone (ROCEPHIN) IVPB 1,000 mg (0 mg Intravenous Stopped 8/11/23 1401)   ketorolac (TORADOL) injection 30 mg (30 mg Intravenous Given 8/11/23 1349)        ED Course:         Labs:    Lab Results (last 24 hours)       Procedure Component Value Units Date/Time    CBC & Differential [[id number]]  (Abnormal) Collected: 08/11/23 1154    Specimen: Blood Updated: 08/11/23 1200    Narrative:      The following orders were created for panel order CBC & Differential.  Procedure                               Abnormality         Status                     ---------                               -----------         ------                     CBC Auto Differential[[id number]]        Abnormal            Final result                 Please view results for these tests on the individual orders.    Comprehensive Metabolic Panel [[id number]]  (Abnormal) Collected: 08/11/23 1154    Specimen: Blood Updated: 08/11/23 1220     Glucose 138 mg/dL      BUN 7 mg/dL      Creatinine 0.76 mg/dL      Sodium 136 mmol/L      Potassium 3.6 mmol/L      Chloride 104 mmol/L      CO2 20.8 mmol/L      Calcium 8.8  mg/dL      Total Protein 6.8 g/dL      Albumin 4.0 g/dL      ALT (SGPT) 13 U/L      AST (SGOT) 13 U/L      Alkaline Phosphatase 86 U/L      Total Bilirubin 0.3 mg/dL      Globulin 2.8 gm/dL      A/G Ratio 1.4 g/dL      BUN/Creatinine Ratio 9.2     Anion Gap 11.2 mmol/L      eGFR 110.3 mL/min/1.73     Narrative:      GFR Normal >60  Chronic Kidney Disease <60  Kidney Failure <15      Lipase [[id number]]  (Abnormal) Collected: 08/11/23 1154    Specimen: Blood Updated: 08/11/23 1220     Lipase 12 U/L     hCG, Quantitative, Pregnancy [[id number]] Collected: 08/11/23 1154    Specimen: Blood Updated: 08/11/23 1218     HCG Quantitative <0.50 mIU/mL     Narrative:      HCG Ranges by Gestational Age    Females - non-pregnant premenopausal   </= 1mIU/mL HCG  Females - postmenopausal               </= 7mIU/mL HCG    3 Weeks       5.4   -      72 mIU/mL  4 Weeks      10.2   -     708 mIU/mL  5 Weeks       217   -   8,245 mIU/mL  6 Weeks       152   -  32,177 mIU/mL  7 Weeks     4,059   - 153,767 mIU/mL  8 Weeks    31,366   - 149,094 mIU/mL  9 Weeks    59,109   - 135,901 mIU/mL  10 Weeks   44,186   - 170,409 mIU/mL  12 Weeks   27,107   - 201,615 mIU/mL  14 Weeks   24,302   -  93,646 mIU/mL  15 Weeks   12,540   -  69,747 mIU/mL  16 Weeks    8,904   -  55,332 mIU/mL  17 Weeks    8,240   -  51,793 mIU/mL  18 Weeks    9,649   -  55,271 mIU/mL      CBC Auto Differential [[id number]]  (Abnormal) Collected: 08/11/23 1154    Specimen: Blood Updated: 08/11/23 1200     WBC 9.20 10*3/mm3      RBC 4.38 10*6/mm3      Hemoglobin 13.5 g/dL      Hematocrit 39.6 %      MCV 90.4 fL      MCH 30.8 pg      MCHC 34.1 g/dL      RDW 13.2 %      RDW-SD 43.2 fl      MPV 9.5 fL      Platelets 244 10*3/mm3      Neutrophil % 63.6 %      Lymphocyte % 27.8 %      Monocyte % 4.8 %      Eosinophil % 2.4 %      Basophil % 0.5 %      Immature Grans % 0.9 %      Neutrophils, Absolute 5.85 10*3/mm3      Lymphocytes, Absolute 2.56 10*3/mm3      Monocytes, Absolute 0.44  10*3/mm3      Eosinophils, Absolute 0.22 10*3/mm3      Basophils, Absolute 0.05 10*3/mm3      Immature Grans, Absolute 0.08 10*3/mm3      nRBC 0.0 /100 WBC     Urinalysis With Microscopic If Indicated (No Culture) - Urine, Clean Catch [[id number]]  (Abnormal) Collected: 08/11/23 1200    Specimen: Urine, Clean Catch Updated: 08/11/23 1214     Color, UA Yellow     Appearance, UA Cloudy     pH, UA 6.5     Specific Gravity, UA 1.018     Glucose, UA Negative     Ketones, UA Negative     Bilirubin, UA Negative     Blood, UA Negative     Protein, UA Negative     Leuk Esterase, UA Moderate (2+)     Nitrite, UA Negative     Urobilinogen, UA 0.2 E.U./dL    Urinalysis, Microscopic Only - Urine, Clean Catch [[id number]]  (Abnormal) Collected: 08/11/23 1200    Specimen: Urine, Clean Catch Updated: 08/11/23 1214     RBC, UA 0-2 /HPF      WBC, UA 31-50 /HPF      Bacteria, UA 3+ /HPF      Squamous Epithelial Cells, UA 7-12 /HPF      Hyaline Casts, UA 0-2 /LPF      Methodology Automated Microscopy             Imaging:    CT Abdomen Pelvis Without Contrast    Result Date: 8/11/2023  PROCEDURE: CT ABDOMEN PELVIS WO CONTRAST  COMPARISON: [facility], CT, ABDOMEN/PELVIS WITH CONTRAST, 7/23/2018, 14:50.  INDICATIONS: RIGHT flank pain  TECHNIQUE: CT images were created without intravenous contrast.   PROTOCOL:   Standard imaging protocol performed    RADIATION:   DLP: 983.5 mGy*cm   Automated exposure control was utilized to minimize radiation dose.  FINDINGS:    Lung bases:  Lung bases are clear.  No free air noted below diaphragm  Organs:  Limited noncontrast imaging of the kidneys and renal collecting systems is unremarkable no evidence of renal calculi or obstructive uropathy.  Limited noncontrast imaging of the liver gallbladder pancreas and adrenal glands are unremarkable  GI tract:  The stomach and small bowel are unremarkable in appearance.  There is no suspicious mesenteric adenopathy fluid collection noted.  The  ileocecal valve and appendix appear unremarkable.  The colon demonstrates no acute abnormality  Pelvis:  Mild enlargement of the right ovary noted with underlying low-attenuation region measuring approximately 3.6 x 3.9 cm suggest an underlying cyst.  Left ovary uterus and urinary bladder appear grossly unremarkable in appearance  Retroperitoneum:  The aorta is normal and caliber.  There is no suspicious retroperitoneal adenopathy  Bones and soft tissues:  No destructive bone lesion.        1. No evidence of renal calculi or obstructive uropathy 2. Mild enlargement of the right ovary with suspected underlying cyst.  If the patient has right-sided pelvic pain follow-up could always be considered with a pelvic ultrasound     SUZANNA HARDY MD       Electronically Signed and Approved By: SUZANNA HARDY MD on 8/11/2023 at 13:40             US Non-ob Transvaginal    Result Date: 8/11/2023  PROCEDURE: US NON-OB TRANSVAGINAL  COMPARISON: [facility], CT, CT ABDOMEN PELVIS WO CONTRAST, 8/11/2023, 12:59.  INDICATIONS: pelvic pain  TECHNIQUE: Ultrasound examination of the pelvis was performed, using endovaginal technique.   FINDINGS:  The uterus measures 6.7 cm in length.  The uterus measures 4.5 cm x 3.3 cm in transverse and AP dimension.  The myometrium is uniformly echoic.  The endometrium is uniformly echoic.  The endometrial bi layer measures 11 mm.  The right ovary measures 5.4 cm in greatest dimension, with a volume of 51.5 cc.  A 3.6 cm x 3.4 cm x 3.3 cm complex hypoechoic lesion in the right ovary contains fine septations, and is consistent with a hemorrhagic cyst.No further imaging follow-up is recommended for typical-appearing hemorrhagic adnexal cysts <5 cm in women of reproductive age.  Reference:  Sandra Robertson MD, et al.  Management of Asymptomatic Ovarian and Other Adnexal Cysts Imaged at Ultrasound: Society of Radiologists in Ultrasound Consensus Conference Statement. Radiology 2010;  256:943-954.  The left ovary measures 3.4 cm in greatest dimension, with a volume of 15.1 cc.  A small amount of free pelvic fluid is seen.        Transvaginal pelvic ultrasound demonstrating 3.6 cm complex hypoechoic lesion in the right ovary with fine septations consistent with hemorrhagic cyst, as above     WILMER MOSS MD       Electronically Signed and Approved By: WILMER MOSS MD on 8/11/2023 at 15:12                Differential Diagnosis and Discussion:    Extremity Pain: Differential diagnosis includes but is not limited to soft tissue sprain, tendonitis, tendon injury, dislocation, fracture, deep vein thrombosis, arterial insufficiency, osteoarthritis, bursitis, and ligamentous damage.    All labs were reviewed and interpreted by me.  CT scan radiology impression was interpreted by me.  Ultrasound impression was interpreted by me.     MDM     Amount and/or Complexity of Data Reviewed  Clinical lab tests: reviewed  Tests in the radiology section of [facility]: reviewed       The patient's CBC that was reviewed and interpreted by me shows no abnormalities of critical concern. Of note, there is no anemia requiring a blood transfusion and the platelet count is acceptable.  The patient's CMP that was reviewed and interpretted by me shows no abnormalities of critical concern. Of note, the patient's sodium and potassium are acceptable. The patient's liver enzymes are unremarkable. The patient's renal function (creatinine) is preserved. The patient has a normal anion gap.  Urinalysis shows 3+ bacteriuria.  CT scan does show a ovarian cyst.    The patient is resting comfortably and feels better, is alert and in no distress. Repeat examination is unremarkable and benign; in particular, there's no discomfort at McBurney's point and there is no pulsatile mass. The history, exam, diagnostic testing, and current condition does not suggest acute appendicitis, bowel obstruction, acute cholecystitis, bowel perforation, major  gastrointestinal bleeding, severe diverticulitis, abdominal aortic aneurysm, mesenteric ischemia, volvulus, sepsis, or other significant pathology that warrants further testing, continued ED treatment, admission, for surgical evaluation at this point. The vital signs have been stable. The patient does not have uncontrollable pain, intractable vomiting, or other significant symptoms. The patient's condition is stable and appropriate for discharge from the emergency department.        Patient Care Considerations:    I considered consulting GYN, however the patient has no signs of torsion on ultrasound.      Consultants/Shared Management Plan:    None    Social Determinants of Health:    Patient is independent, reliable, and has access to care.       Disposition and Care Coordination:    Discharged: I considered escalation of care by admitting this patient for observation, however the patient has improved and is suitable and  stable for discharge.    I have explained the patient's condition, diagnoses and treatment plan based on the information available to me at this time. I have answered questions and addressed any concerns. The patient has a good  understanding of the patient's diagnosis, condition, and treatment plan as can be expected at this point. The vital signs have been stable. The patient's condition is stable and appropriate for discharge from the emergency department.      The patient will pursue further outpatient evaluation with the primary care physician or other designated or consulting physician as outlined in the discharge instructions. They are agreeable to this plan of care and follow-up instructions have been explained in detail. The patient has received these instructions in written format and have expressed an understanding of the discharge instructions. The patient is aware that any significant change in condition or worsening of symptoms should prompt an immediate return to this or the closest  emergency department or call to 911.  I have explained discharge medications and the need for follow up with the patient/caretakers. This was also printed in the discharge instructions. Patient was discharged with the following medications and follow up:      Medication List        New Prescriptions      cephalexin 500 MG capsule  Commonly known as: KEFLEX  Take 1 capsule by mouth 4 (Four) Times a Day.     ketorolac 10 MG tablet  Commonly known as: TORADOL  Take 1 tablet by mouth Every 6 (Six) Hours As Needed for Moderate Pain.     oxyCODONE-acetaminophen 5-325 MG per tablet  Commonly known as: PERCOCET  Take 1 tablet by mouth Every 6 (Six) Hours As Needed for Severe Pain.               Where to Get Your Medications        These medications were sent to [id number] - [place], [place] - [address] AT [facility] OF [address] & [place] - [phone]  - [phone] FX  [address] [place] [address]      Phone: [phone]   cephalexin 500 MG capsule  ketorolac 10 MG tablet  oxyCODONE-acetaminophen 5-325 MG per tablet      Amber Pearson, APRN  [address]  [address]  [place] [address] [phone]    In 2 days         Final diagnoses:   Cyst of ovary, unspecified laterality        ED Disposition       ED Disposition   Discharge    Condition   Stable    Comment   --               This medical record created using voice recognition software.             Carla Burgos MD  08/11/23 7452

## 2023-08-16 ENCOUNTER — OFFICE VISIT (OUTPATIENT)
Dept: OBSTETRICS AND GYNECOLOGY | Facility: CLINIC | Age: 28
End: 2023-08-16
Payer: COMMERCIAL

## 2023-08-16 VITALS
WEIGHT: 247 LBS | DIASTOLIC BLOOD PRESSURE: 80 MMHG | HEART RATE: 73 BPM | HEIGHT: 65 IN | BODY MASS INDEX: 41.15 KG/M2 | SYSTOLIC BLOOD PRESSURE: 138 MMHG

## 2023-08-16 DIAGNOSIS — N83.201 CYST OF RIGHT OVARY: ICD-10-CM

## 2023-08-16 DIAGNOSIS — R10.2 PELVIC PAIN IN FEMALE: Primary | ICD-10-CM

## 2023-08-16 RX ORDER — PHENTERMINE HYDROCHLORIDE 37.5 MG/1
TABLET ORAL
COMMUNITY
End: 2023-08-21

## 2023-08-16 NOTE — PROGRESS NOTES
"GYN Problem/Follow Up Visit    Chief Complaint   Patient presents with    Ovarian Cyst           HPI  Amber Lowry is a 28 y.o. female, , who presents for er f/u. States has been dealing with a right ovarian cyst for eight months. Saw her pcp at that time who checked a pelvic u/s due to pelvic pain. Was dx with cyst at that time and had repeat u/s six weeks later to monitor it and it had gotten slightly smaller. She states the pelvic pain comes and goes and gets really severe at times. It flared up last week and she ended up in the er and the cyst is still there.       Additional OB/GYN History   Patient's last menstrual period was 2023.  Current contraception: contraceptive methods: None  Desires to: do not start contraception  Allergies : Patient has no known allergies.     The additional following portions of the patient's history were reviewed and updated as appropriate: allergies, current medications, past family history, past medical history, past social history, past surgical history, and problem list.    Review of Systems    I have reviewed and agree with the HPI, ROS, and historical information as entered above. Kia Jasso, APRN    Objective   /80   Pulse 73   Ht 165.1 cm (65\")   Wt 112 kg (247 lb)   LMP 2023   BMI 41.10 kg/mý     Physical Exam  Vitals reviewed.   Abdominal:      Palpations: Abdomen is soft.      Tenderness: There is abdominal tenderness (tenderness noted in RLQ).   Skin:     General: Skin is warm and dry.   Neurological:      Mental Status: She is alert and oriented to person, place, and time.          Assessment and Plan    Diagnoses and all orders for this visit:    1. Pelvic pain in female (Primary)    2. Cyst of right ovary    Reviewed imaging done 23: 3.6 cm hemorrhagic right ovarian cyst. Discussed monitoring the cyst, starting bc to help control the growth of it, or seeing gyn doc for an eval of her pain. She states otc meds do not help " anymore and she has tried bc but gained a lot of weight with it. She desires seeing gyn doc to discuss possible surgical options.     Counseling:  She understands the importance of having the above orders performed in a timely fashion.  She is encouraged to review her results online and/or contact or office if she has questions.     Follow Up:  Return for eval by gyn doc for recurrent right ovarian cyst and pelvic pain.      Kia Jasso, APRN  08/16/2023

## 2023-08-18 NOTE — PROGRESS NOTES
GYN Visit    Chief Complaint   Patient presents with    Ovarian Cyst     Pelvic pain, discomfort        HPI:   28 y.o. with LMP  here for AP.  Menses q mo x 3-4 days with 1 day heavy.  Pt states has hx recurrent ovarian cyst.  Pt states pain intermittent but can be severe to point that she has been seen in ER/ urgent care.  She is undergoing evaluation with primary provider for secondary infertility.  She has been diagnosed with PCOS in the past and feels that cyst is causing her pain.        History: PMHx, Meds, Allergies, PSHx, Social Hx, and POBHx all reviewed and updated.    PHYSICAL EXAM:  /85   Pulse 80   Wt 111 kg (244 lb 3.2 oz)   LMP 2023 Comment: Heavy cycles, changing pad 6 times a day, painful, lasting 3-4 days  Breastfeeding No   BMI 40.64 kg/mý   General- NAD, alert and oriented, appropriate  Psych- Normal mood, good memory        External genitalia- Normal female, no lesions  Urethra/meatus- Normal, no masses, non tender, no prolapse  Bladder- Normal, no masses, non tender, no prolapse  Vagina- Normal, no atrophy, no lesions, no discharge, no prolapse  Cvx- Normal, no lesions, no discharge, No cervical motion tenderness  Uterus- Normal size, shape & consistency.  Non tender, mobile, & no prolapse, but exam limited by body habitus  Adnexa- No mass, non tender, but exam limited by body habitus  Anus/Rectum/Perineum- Not performed    Lymphatic- No palpable neck, axillary, or groin nodes  Ext- No edema, no cyanosis    Skin- No lesions, no rashes, no acanthosis nigricans      CT Abdomen Pelvis Without Contrast (2023 13:08)    US Non-ob Transvaginal (2023 14:53)    ASSESSMENT AND PLAN:  Diagnoses and all orders for this visit:    1. Pelvic pain (Primary)    2. Cyst of ovary, unspecified laterality   Pt with long history of pelvic pain and ovarian cysts.  She was interested in surgery for removal.  Pt interested in fertility and undergoing workup at present.  I  informed patient that I was not certain that the cyst was the cause of her pain.  I would not encourage surgical intervention for cyst removal as there is always the possibility of the ovary being removed and the patient is trying to get pregnant.  Pt should consider other possible causes for her pain.  She desires to f/u with Dr Stevens whom she has seen in the past.            Follow Up:  No follow-ups on file.          Manju Brown,   08/21/2023    [facility]  [address] [place] [address]  Dept: [phone]  Dept Fax: [phone]  Loc: [phone]  Loc Fax: [phone]

## 2023-08-21 ENCOUNTER — OFFICE VISIT (OUTPATIENT)
Dept: OBSTETRICS AND GYNECOLOGY | Facility: CLINIC | Age: 28
End: 2023-08-21
Payer: COMMERCIAL

## 2023-08-21 VITALS
WEIGHT: 244.2 LBS | DIASTOLIC BLOOD PRESSURE: 85 MMHG | HEART RATE: 80 BPM | SYSTOLIC BLOOD PRESSURE: 130 MMHG | BODY MASS INDEX: 40.64 KG/M2

## 2023-08-21 DIAGNOSIS — N83.209 CYST OF OVARY, UNSPECIFIED LATERALITY: ICD-10-CM

## 2023-08-21 DIAGNOSIS — R10.2 PELVIC PAIN: Primary | ICD-10-CM

## 2023-08-21 PROCEDURE — 1159F MED LIST DOCD IN RCRD: CPT | Performed by: OBSTETRICS & GYNECOLOGY

## 2023-08-21 PROCEDURE — 3075F SYST BP GE 130 - 139MM HG: CPT | Performed by: OBSTETRICS & GYNECOLOGY

## 2023-08-21 PROCEDURE — 99213 OFFICE O/P EST LOW 20 MIN: CPT | Performed by: OBSTETRICS & GYNECOLOGY

## 2023-08-21 PROCEDURE — 3079F DIAST BP 80-89 MM HG: CPT | Performed by: OBSTETRICS & GYNECOLOGY

## 2023-08-21 PROCEDURE — 1160F RVW MEDS BY RX/DR IN RCRD: CPT | Performed by: OBSTETRICS & GYNECOLOGY

## 2023-08-24 ENCOUNTER — PATIENT MESSAGE (OUTPATIENT)
Dept: OBSTETRICS AND GYNECOLOGY | Facility: CLINIC | Age: 28
End: 2023-08-24
Payer: COMMERCIAL

## 2023-08-25 DIAGNOSIS — B37.9 YEAST INFECTION: Primary | ICD-10-CM

## 2023-08-25 RX ORDER — FLUCONAZOLE 150 MG/1
TABLET ORAL
Qty: 2 TABLET | Refills: 0 | Status: SHIPPED | OUTPATIENT
Start: 2023-08-25

## 2023-08-25 NOTE — TELEPHONE ENCOUNTER
The patient was last seen on 08/21/23 by Dr. Brown.  She had been given an antibiotic on 08/11/23 and it has caused a yeast infection.  Per protocol, I have sent an Rx to her pharmacy for Diflucan 150 mg #2 1 po today, then repeat 1 po in 3 days.  It has not been prescribed to her in the last 6 months.

## 2023-08-25 NOTE — TELEPHONE ENCOUNTER
From: Amber Lowry  To: Manju Brown  Sent: 8/24/2023 8:55 AM EDT  Subject: Yeast infection     Rommel Brown. I was wondering if you could send in something for a yeast infection. The antibiotics caused it and yogurt and over the counter yeast infection medication isn't doing the trick.

## 2024-02-11 ENCOUNTER — HOSPITAL ENCOUNTER (EMERGENCY)
Facility: HOSPITAL | Age: 29
Discharge: HOME | End: 2024-02-11
Attending: EMERGENCY MEDICINE | Admitting: EMERGENCY MEDICINE
Payer: COMMERCIAL

## 2024-02-11 VITALS
BODY MASS INDEX: 40.29 KG/M2 | WEIGHT: 241.84 LBS | RESPIRATION RATE: 17 BRPM | OXYGEN SATURATION: 96 % | DIASTOLIC BLOOD PRESSURE: 81 MMHG | HEART RATE: 84 BPM | HEIGHT: 65 IN | SYSTOLIC BLOOD PRESSURE: 118 MMHG | TEMPERATURE: 98.6 F

## 2024-02-11 DIAGNOSIS — R31.9 HEMATURIA, UNSPECIFIED TYPE: ICD-10-CM

## 2024-02-11 DIAGNOSIS — O03.4 INCOMPLETE MISCARRIAGE: Primary | ICD-10-CM

## 2024-02-11 LAB
ABO GROUP BLD: NORMAL
ABO GROUP BLD: NORMAL
BACTERIA UR QL AUTO: ABNORMAL /HPF
BASOPHILS # BLD AUTO: 0.05 10*3/MM3 (ref 0–0.2)
BASOPHILS NFR BLD AUTO: 0.6 % (ref 0–1.5)
BILIRUB UR QL STRIP: NEGATIVE
BLD GP AB SCN SERPL QL: NEGATIVE
CLARITY UR: CLEAR
COLOR UR: YELLOW
DEPRECATED RDW RBC AUTO: 43.2 FL (ref 37–54)
EOSINOPHIL # BLD AUTO: 0.28 10*3/MM3 (ref 0–0.4)
EOSINOPHIL NFR BLD AUTO: 3.6 % (ref 0.3–6.2)
ERYTHROCYTE [DISTWIDTH] IN BLOOD BY AUTOMATED COUNT: 13.2 % (ref 12.3–15.4)
GLUCOSE UR STRIP-MCNC: NEGATIVE MG/DL
HCG INTACT+B SERPL-ACNC: <0.5 MIU/ML
HCT VFR BLD AUTO: 42.8 % (ref 34–46.6)
HGB BLD-MCNC: 14.5 G/DL (ref 12–15.9)
HGB UR QL STRIP.AUTO: ABNORMAL
HOLD SPECIMEN: NORMAL
HOLD SPECIMEN: NORMAL
HYALINE CASTS UR QL AUTO: ABNORMAL /LPF
IMM GRANULOCYTES # BLD AUTO: 0.06 10*3/MM3 (ref 0–0.05)
IMM GRANULOCYTES NFR BLD AUTO: 0.8 % (ref 0–0.5)
KETONES UR QL STRIP: NEGATIVE
LEUKOCYTE ESTERASE UR QL STRIP.AUTO: NEGATIVE
LYMPHOCYTES # BLD AUTO: 2 10*3/MM3 (ref 0.7–3.1)
LYMPHOCYTES NFR BLD AUTO: 25.9 % (ref 19.6–45.3)
MCH RBC QN AUTO: 30.7 PG (ref 26.6–33)
MCHC RBC AUTO-ENTMCNC: 33.9 G/DL (ref 31.5–35.7)
MCV RBC AUTO: 90.5 FL (ref 79–97)
MONOCYTES # BLD AUTO: 0.45 10*3/MM3 (ref 0.1–0.9)
MONOCYTES NFR BLD AUTO: 5.8 % (ref 5–12)
NEUTROPHILS NFR BLD AUTO: 4.87 10*3/MM3 (ref 1.7–7)
NEUTROPHILS NFR BLD AUTO: 63.3 % (ref 42.7–76)
NITRITE UR QL STRIP: NEGATIVE
NRBC BLD AUTO-RTO: 0 /100 WBC (ref 0–0.2)
PH UR STRIP.AUTO: 8 [PH] (ref 5–8)
PLATELET # BLD AUTO: 268 10*3/MM3 (ref 140–450)
PMV BLD AUTO: 9.7 FL (ref 6–12)
PROT UR QL STRIP: ABNORMAL
RBC # BLD AUTO: 4.73 10*6/MM3 (ref 3.77–5.28)
RBC # UR STRIP: ABNORMAL /HPF
REF LAB TEST METHOD: ABNORMAL
RH BLD: POSITIVE
RH BLD: POSITIVE
SP GR UR STRIP: 1.01 (ref 1–1.03)
SQUAMOUS #/AREA URNS HPF: ABNORMAL /HPF
T&S EXPIRATION DATE: NORMAL
UROBILINOGEN UR QL STRIP: ABNORMAL
WBC # UR STRIP: ABNORMAL /HPF
WBC NRBC COR # BLD AUTO: 7.71 10*3/MM3 (ref 3.4–10.8)
WHOLE BLOOD HOLD COAG: NORMAL
WHOLE BLOOD HOLD SPECIMEN: NORMAL

## 2024-02-11 PROCEDURE — 84702 CHORIONIC GONADOTROPIN TEST: CPT | Performed by: EMERGENCY MEDICINE

## 2024-02-11 PROCEDURE — 86900 BLOOD TYPING SEROLOGIC ABO: CPT | Performed by: EMERGENCY MEDICINE

## 2024-02-11 PROCEDURE — 86850 RBC ANTIBODY SCREEN: CPT | Performed by: EMERGENCY MEDICINE

## 2024-02-11 PROCEDURE — 86901 BLOOD TYPING SEROLOGIC RH(D): CPT | Performed by: EMERGENCY MEDICINE

## 2024-02-11 PROCEDURE — 85025 COMPLETE CBC W/AUTO DIFF WBC: CPT | Performed by: EMERGENCY MEDICINE

## 2024-02-11 PROCEDURE — 81001 URINALYSIS AUTO W/SCOPE: CPT | Performed by: EMERGENCY MEDICINE

## 2024-02-11 PROCEDURE — 99283 EMERGENCY DEPT VISIT LOW MDM: CPT

## 2024-02-11 RX ORDER — SODIUM CHLORIDE 0.9 % (FLUSH) 0.9 %
10 SYRINGE (ML) INJECTION AS NEEDED
Status: DISCONTINUED | OUTPATIENT
Start: 2024-02-11 | End: 2024-02-11 | Stop reason: HOSPADM

## 2024-02-11 RX ORDER — PHENAZOPYRIDINE HYDROCHLORIDE 200 MG/1
200 TABLET, FILM COATED ORAL 3 TIMES DAILY PRN
Qty: 14 TABLET | Refills: 0 | Status: SHIPPED | OUTPATIENT
Start: 2024-02-11

## 2024-03-11 ENCOUNTER — TELEPHONE (OUTPATIENT)
Dept: OBSTETRICS AND GYNECOLOGY | Facility: CLINIC | Age: 29
End: 2024-03-11
Payer: COMMERCIAL

## 2024-10-11 ENCOUNTER — PATIENT ROUNDING (BHMG ONLY) (OUTPATIENT)
Dept: URGENT CARE | Facility: CLINIC | Age: 29
End: 2024-10-11
Payer: COMMERCIAL

## 2024-10-11 NOTE — ED NOTES
Thank you for letting us care for you in your recent visit to our urgent care center. We would love to hear about your experience with us. Was this the first time you have visited our location?    We’re always looking for ways to make our patients’ experiences even better. Do you have any recommendations on ways we may improve?     I appreciate you taking the time to respond. Please be on the lookout for a survey about your recent visit from [employer] via text or email. We would greatly appreciate if you could fill that out and turn it back in. We want your voice to be heard and we value your feedback.   Thank you for choosing [facility] for your healthcare needs.

## 2024-10-25 ENCOUNTER — OFFICE VISIT (OUTPATIENT)
Dept: ORTHOPEDIC SURGERY | Facility: CLINIC | Age: 29
End: 2024-10-25
Payer: COMMERCIAL

## 2024-10-25 VITALS
BODY MASS INDEX: 41.48 KG/M2 | WEIGHT: 249 LBS | HEART RATE: 77 BPM | HEIGHT: 65 IN | DIASTOLIC BLOOD PRESSURE: 84 MMHG | OXYGEN SATURATION: 93 % | SYSTOLIC BLOOD PRESSURE: 126 MMHG

## 2024-10-25 DIAGNOSIS — M79.642 LEFT HAND PAIN: Primary | ICD-10-CM

## 2024-10-25 DIAGNOSIS — S63.639A SPRAIN OF PROXIMAL INTERPHALANGEAL (PIP) JOINT OF FINGER: ICD-10-CM

## 2024-10-25 NOTE — PROGRESS NOTES
"Chief Complaint  Initial Evaluation of the Left Hand       Subjective      Amber Lowry presents to [facility] for an evaluation  of her left hand. She was wrestling with her boyfriend about two weeks ago when she injured her finger. She went to urgent care two weeks ago when she had x-rays and placed in a splint. She reports no prior surgery to her hand.     Allergies   Allergen Reactions    Trazodone Unknown - Low Severity     Gives nightmares        Social History     Socioeconomic History    Marital status: Single    Highest education level: Some college, no degree   Tobacco Use    Smoking status: Every Day     Current packs/day: 1.00     Average packs/day: 1 pack/day for 10.0 years (10.0 ttl pk-yrs)     Types: Cigarettes    Smokeless tobacco: Never   Vaping Use    Vaping status: Never Used   Substance and Sexual Activity    Alcohol use: Not Currently    Drug use: Yes     Types: Marijuana    Sexual activity: Yes     Partners: Male     Birth control/protection: None        I reviewed the patient's chief complaint, history of present illness, review of systems, past medical history, surgical history, family history, social history, medications, and allergy list.     Review of Systems     Constitutional: Denies fevers, chills, weight loss  Cardiovascular: Denies chest pain, shortness of breath  Skin: Denies rashes, acute skin changes  Neurologic: Denies headache, loss of consciousness  MSK: Left hand pain       Vital Signs:   /84   Pulse 77   Ht 165.1 cm (65\")   Wt 113 kg (249 lb)   SpO2 93%   BMI 41.44 kg/m²          Physical Exam  General: Alert. No acute distress    Ortho Exam        Left upper extremity: no swelling, no deformity, mild tenderness, full extension, stiffness with range of motion, no malrotation, sensation intact to the medial, radial and ulnar nerve       Orthopedic Injury Treatment    Date/Time: 10/25/2024 1:41 PM    Performed by: Allan" RACHEL Obando  Authorized by: Diomedes Rhodes MD  Injury location: finger  Location details: left little finger  Pre-procedure neurovascular assessment: neurovascularly intact    Anesthesia:  Local anesthesia used: no    Sedation:  Patient sedated: no    Immobilization: splint  Splint type: grecia strap.  Supplies used: grecia strap.  Post-procedure neurovascular assessment: post-procedure neurovascularly intact  Patient tolerance: patient tolerated the procedure well with no immediate complications      X-Ray Report:  Left hand  X-Ray  Indication: Evaluation of left hand pain   AP/Lateral view(s)  Findings: no acute fracture   Prior studies available for comparison: yes       Imaging Results (Most Recent)       Procedure Component Value Units Date/Time    XR Hand 2 View Left [[id number]] Resulted: 10/25/24 1322     Updated: 10/25/24 1324             Result Review :     XR Finger 2+ View Left    Addendum Date: 10/10/2024 Addendum:   ADDENDUM #1 Addendum to FINDINGS and IMPRESSION: There is a cortical lucency at the radial aspect of the fifth proximal phalanx with surrounding soft tissue edema and without displacement. Findings may represent nondisplaced fracture over the possibility of a vascular channel. Recommend radiographs in 1 week for further evaluation. Electronically Signed: Rusty Law  10/10/2024 5:52 PM EDT  Workstation ID: [id number] ORIGINAL REPORT: XR FINGER 2+ VW LEFT Date of Exam: 10/10/2024 4:45 PM EDT Indication: Left pinky finger pain, left pinky finger injury Comparison: None available. Findings: No acute fracture or malalignment.  No focal soft tissue abnormalities appreciated. Joint spaces are grossly preserved without significant degenerative change. Impression: No acute osseous abnormality Electronically Signed: Rusty Law  10/10/2024 5:27 PM EDT  Workstation ID: [id number]    Result Date: 10/10/2024  Narrative: XR FINGER 2+ VW LEFT Date of Exam: 10/10/2024 4:45 PM EDT Indication: Left pinky  finger pain, left pinky finger injury Comparison: None available. Findings: No acute fracture or malalignment.  No focal soft tissue abnormalities appreciated. Joint spaces are grossly preserved without significant degenerative change.     Impression: Impression: No acute osseous abnormality Electronically Signed: Rusty Law  10/10/2024 5:27 PM EDT  Workstation ID: [id number]            Assessment and Plan     Diagnoses and all orders for this visit:    1. Left hand pain (Primary)  -     XR Hand 2 View Left    2. Sprain of proximal interphalangeal (PIP) joint of finger      The patient presents here today for an evaluation  of her left hand. X-rays were obtained in the office today and these were reviewed today.     She can discontinue the splint and will be placed in grecia straps today. She can work on range of motion as tolerated.     She will continue over the counter medications for pain control.      Educated on risk of elevated BMI.  Discussed options for weight loss/decreasing BMI prior to procedure including dietician consult, weight loss options and exercise program., Educated on risk of smoking/nicotine. Discussed options for smoking cessation., and Call or return if worsening symptoms.    Follow Up     PRN     Patient was given instructions and counseling regarding her condition or for health maintenance advice. Please see specific information pulled into the AVS if appropriate.     Scribed for Diomedes Rhodes MD by Marily Dillard.  10/25/24   13:20 EDT    I have personally performed the services described in this document as scribed by the above individual and it is both accurate and complete. Diomedes Rhodes MD 10/25/24

## 2024-12-11 ENCOUNTER — TRANSCRIBE ORDERS (OUTPATIENT)
Dept: ADMINISTRATIVE | Facility: HOSPITAL | Age: 29
End: 2024-12-11
Payer: COMMERCIAL

## 2024-12-11 DIAGNOSIS — N93.9 ABNORMAL UTERINE AND VAGINAL BLEEDING, UNSPECIFIED: Primary | ICD-10-CM

## 2024-12-19 ENCOUNTER — HOSPITAL ENCOUNTER (OUTPATIENT)
Dept: ULTRASOUND IMAGING | Facility: HOSPITAL | Age: 29
Discharge: HOME | End: 2024-12-19
Admitting: NURSE PRACTITIONER
Payer: COMMERCIAL

## 2024-12-19 DIAGNOSIS — N93.9 ABNORMAL UTERINE AND VAGINAL BLEEDING, UNSPECIFIED: ICD-10-CM

## 2024-12-19 PROCEDURE — 76830 TRANSVAGINAL US NON-OB: CPT
